# Patient Record
Sex: FEMALE | Race: WHITE | NOT HISPANIC OR LATINO | Employment: FULL TIME | URBAN - METROPOLITAN AREA
[De-identification: names, ages, dates, MRNs, and addresses within clinical notes are randomized per-mention and may not be internally consistent; named-entity substitution may affect disease eponyms.]

---

## 2017-11-06 ENCOUNTER — OFFICE VISIT (OUTPATIENT)
Dept: UROGYNECOLOGY | Facility: CLINIC | Age: 22
End: 2017-11-06
Payer: COMMERCIAL

## 2017-11-06 VITALS — HEIGHT: 67 IN | BODY MASS INDEX: 16.74 KG/M2 | WEIGHT: 106.69 LBS

## 2017-11-06 DIAGNOSIS — Z01.419 WELL WOMAN EXAM: Primary | ICD-10-CM

## 2017-11-06 DIAGNOSIS — N93.0 PCB (POST COITAL BLEEDING): ICD-10-CM

## 2017-11-06 LAB
B-HCG UR QL: NEGATIVE
CTP QC/QA: YES

## 2017-11-06 PROCEDURE — 88175 CYTOPATH C/V AUTO FLUID REDO: CPT

## 2017-11-06 PROCEDURE — 99999 PR PBB SHADOW E&M-NEW PATIENT-LVL III: CPT | Mod: PBBFAC,,, | Performed by: NURSE PRACTITIONER

## 2017-11-06 PROCEDURE — 99385 PREV VISIT NEW AGE 18-39: CPT | Mod: S$GLB,,, | Performed by: NURSE PRACTITIONER

## 2017-11-06 PROCEDURE — 87591 N.GONORRHOEAE DNA AMP PROB: CPT

## 2017-11-06 PROCEDURE — 81025 URINE PREGNANCY TEST: CPT | Mod: S$GLB,,, | Performed by: NURSE PRACTITIONER

## 2017-11-06 PROCEDURE — 87480 CANDIDA DNA DIR PROBE: CPT

## 2017-11-06 PROCEDURE — 87660 TRICHOMONAS VAGIN DIR PROBE: CPT

## 2017-11-06 RX ORDER — DESOGESTREL AND ETHINYL ESTRADIOL AND ETHINYL ESTRADIOL 21-5 (28)
KIT ORAL
COMMUNITY
Start: 2017-10-07

## 2017-11-06 RX ORDER — FLUOXETINE 20 MG/1
TABLET ORAL
COMMUNITY
Start: 2017-10-07

## 2017-11-06 RX ORDER — HYDROCORTISONE 25 MG/G
CREAM TOPICAL
COMMUNITY
Start: 2017-10-12

## 2017-11-06 NOTE — PROGRESS NOTES
"11/06/2017    SUBJECTIVE:   22 y.o. female for annual exam. Complains of post coital bleeding.    Past Medical History:   Diagnosis Date    Anxiety        Past Surgical History:   Procedure Laterality Date    APPENDECTOMY      WISDOM TOOTH EXTRACTION         Current Outpatient Prescriptions   Medication Sig Dispense Refill    FLUoxetine 20 MG tablet       PROCTOSOL HC 2.5 % rectal cream       VIORELE, 28, 0.15-0.02 mgx21 /0.01 mg x 5 per tablet        No current facility-administered medications for this visit.      Allergies: Patient has no known allergies.   Patient's last menstrual period was 10/29/2017 (exact date).      Well Woman:  Pap:n/a  Mammo:n/a  Colonoscopy:n/a  Dexa:n/a    ROS:  Feeling well.   No dyspnea or chest pain on exertion.    No abdominal pain, change in bowel habits, black or bloody stools.    No urinary tract symptoms.   GYN ROS: normal menses, no abnormal bleeding, pelvic pain or discharge, no breast pain or new or enlarging lumps on self exam, she complains of post coital bleeding.  No neurological complaints.    OBJECTIVE:   The patient appears well, alert, oriented x 3, in no distress.  Ht 5' 7" (1.702 m)   Wt 48.4 kg (106 lb 11.2 oz)   LMP 10/29/2017 (Exact Date)   BMI 16.71 kg/m²   ENT normal.  Neck supple. No adenopathy or thyromegaly. MAGNO.   Lungs are clear, good air entry, no wheezes, rhonchi or rales.   S1 and S2 normal, no murmurs, regular rate and rhythm.   Abdomen soft without tenderness, guarding, mass or organomegaly.   Extremities show no edema, normal peripheral pulses.   Neurological is normal, no focal findings.    BREAST EXAM: breasts appear normal, no suspicious masses, no skin or nipple changes or axillary nodes    PELVIC EXAM:   VULVA: normal appearing vulva with no masses, tenderness or lesions,   VAGINA: normal appearing vagina with normal color and discharge, no lesions,  CERVIX: normal appearing cervix without discharge or lesions, friable after pap, "   UTERUS: uterus is normal size, shape, consistency and nontender,   ADNEXA: normal adnexa in size, nontender and no masses,       ASSESSMENT:   1. Well woman exam  POCT urine pregnancy   2. PCB (post coital bleeding)  C. trachomatis/N. gonorrhoeae by AMP DNA Cervicovaginal    Vaginosis Screen by DNA Probe    Liquid-based pap smear, screening       PLAN:       1. Post coital bleeding  --genprobe, affirm today  --pap today-- takes 2-3 weeks for results    30 minutes were spent in face to face time with this patient  75 % of this time was spent in counseling and/or coordination of care  Nazia KENNEDY [facility]and [facility]  Division of Female Pelvic Medicine and Reconstructive Surgery  Department of Obstetrics & Gynecology

## 2017-11-07 LAB
C TRACH DNA SPEC QL NAA+PROBE: NOT DETECTED
CANDIDA RRNA VAG QL PROBE: NEGATIVE
G VAGINALIS RRNA GENITAL QL PROBE: POSITIVE
N GONORRHOEA DNA SPEC QL NAA+PROBE: NOT DETECTED
T VAGINALIS RRNA GENITAL QL PROBE: NEGATIVE

## 2017-11-08 ENCOUNTER — TELEPHONE (OUTPATIENT)
Dept: UROGYNECOLOGY | Facility: CLINIC | Age: 22
End: 2017-11-08

## 2017-11-08 DIAGNOSIS — B96.89 BACTERIAL VAGINOSIS: Primary | ICD-10-CM

## 2017-11-08 DIAGNOSIS — N76.0 BACTERIAL VAGINOSIS: Primary | ICD-10-CM

## 2017-11-08 RX ORDER — METRONIDAZOLE 500 MG/1
500 TABLET ORAL EVERY 12 HOURS
Qty: 14 TABLET | Refills: 0 | Status: SHIPPED | OUTPATIENT
Start: 2017-11-08 | End: 2017-11-15

## 2017-11-09 NOTE — TELEPHONE ENCOUNTER
Notified patient of + bacterial vaginosis.  Rx for flagyl sent to pharmacy.  Nazia Page, RACHELLEP-BC

## 2017-11-17 ENCOUNTER — OFFICE VISIT (OUTPATIENT)
Dept: OBSTETRICS AND GYNECOLOGY | Facility: CLINIC | Age: 22
End: 2017-11-17
Payer: COMMERCIAL

## 2017-11-17 VITALS
SYSTOLIC BLOOD PRESSURE: 98 MMHG | DIASTOLIC BLOOD PRESSURE: 64 MMHG | WEIGHT: 103.19 LBS | BODY MASS INDEX: 16.2 KG/M2 | HEIGHT: 67 IN

## 2017-11-17 DIAGNOSIS — B96.89 BV (BACTERIAL VAGINOSIS): ICD-10-CM

## 2017-11-17 DIAGNOSIS — N92.1 BREAKTHROUGH BLEEDING ON BIRTH CONTROL PILLS: Primary | ICD-10-CM

## 2017-11-17 DIAGNOSIS — N76.0 BV (BACTERIAL VAGINOSIS): ICD-10-CM

## 2017-11-17 PROCEDURE — 99213 OFFICE O/P EST LOW 20 MIN: CPT | Mod: S$GLB,,, | Performed by: NURSE PRACTITIONER

## 2017-11-17 PROCEDURE — 99999 PR PBB SHADOW E&M-EST. PATIENT-LVL III: CPT | Mod: PBBFAC,,, | Performed by: NURSE PRACTITIONER

## 2017-11-17 RX ORDER — METRONIDAZOLE 7.5 MG/G
1 GEL VAGINAL DAILY
Qty: 70 G | Refills: 4 | Status: SHIPPED | OUTPATIENT
Start: 2017-11-17 | End: 2017-11-22

## 2017-11-17 NOTE — PROGRESS NOTES
HISTORY OF PRESENT ILLNESS:    Liane Reilly is a 22 y.o. female, , Patient's last menstrual period was 10/29/2017 (exact date).,  Presents with c/o of BTB on OCPs.  -started with PCB at the beginning of November and had a negative STD screen and was treated for BV.   -however, cannot remember to take the Flagyl every day correctly so requests a vaginal Rx.   -now is having BTB on her OCPs since starting the Flagyl and has two more weeks to go before the sugar pills.   -hasn't missed a pill.   -denies fever, pelvic pain, odor, itching, discharge, dyspareunia, UTI sx.     Past Medical History:   Diagnosis Date    Anxiety        Past Surgical History:   Procedure Laterality Date    APPENDECTOMY      WISDOM TOOTH EXTRACTION         MEDICATIONS AND ALLERGIES:      Current Outpatient Prescriptions:     FLUoxetine 20 MG tablet, , Disp: , Rfl:     PROCTOSOL HC 2.5 % rectal cream, , Disp: , Rfl:     VIORELE, 28, 0.15-0.02 mgx21 /0.01 mg x 5 per tablet, , Disp: , Rfl:     estrogens, conjugated, (PREMARIN) 0.625 MG tablet, Take 1 tablet (0.625 mg total) by mouth once daily., Disp: 30 tablet, Rfl: 0    metroNIDAZOLE (METROGEL VAGINAL) 0.75 % vaginal gel, Place 1 applicator vaginally once daily., Disp: 70 g, Rfl: 4    Review of patient's allergies indicates:  No Known Allergies    Family History   Problem Relation Age of Onset    Ovarian cancer Neg Hx     Breast cancer Neg Hx     Colon cancer Neg Hx        Social History     Social History    Marital status: Single     Spouse name: N/A    Number of children: N/A    Years of education: N/A     Occupational History    Not on file.     Social History Main Topics    Smoking status: Never Smoker    Smokeless tobacco: Never Used    Alcohol use Yes      Comment: occassionally    Drug use: No    Sexual activity: Yes     Partners: Male     Birth control/ protection: Condom     Other Topics Concern    Not on file     Social History Narrative    No narrative on  "file       OB HISTORY: None.     COMPREHENSIVE GYN HISTORY:  PAP History: Denies abnormal Paps. LAST PAP 11-6-17 NORMAL.  Infection History: Denies STDs. Denies PID.  Benign History: Denies uterine fibroids. Denies ovarian cysts. Denies endometriosis. Denies other conditions.  Cancer History: Denies cervical cancer. Denies uterine cancer or hyperplasia. Denies ovarian cancer. Denies vulvar cancer or pre-cancer. Denies vaginal cancer or pre-cancer. Denies breast cancer. Denies colon cancer.  Sexual Activity History: Reports currently being sexually active  Menstrual History: Monthly. Mod then light flow.  SEE HPI - having BTB.  Dysmenorrhea History: Reports mild dysmenorrhea.   Contraception: OCPs.     ROS:  GENERAL: No weight changes. No swelling. No fatigue. No fever.  ABDOMEN: No pain. No nausea. No vomiting. No diarrhea. No constipation.  REPRODUCTIVE: + BTB.   VULVA: No pain. No lesions. No itching.  VAGINA: No relaxation. No itching. No odor. No discharge. No lesions.  URINARY: No incontinence. No nocturia. No frequency. No dysuria.    BP 98/64   Ht 5' 7" (1.702 m)   Wt 46.8 kg (103 lb 2.8 oz)   LMP 10/29/2017 (Exact Date)   BMI 16.16 kg/m²     PE:  APPEARANCE: Well nourished, well developed, in no acute distress.  AFFECT: WNL, alert and oriented x 3.  PELVIC: External female genitalia without lesions.  Female hair distribution. Adequate perineal body, Normal urethral meatus. Vagina moist and well rugated without lesions or discharge. MINIMAL DARK BROWN BLOOD PRESENT.  No significant cystocele or rectocele present. Cervix pink without lesions, discharge or tenderness. NO ACTIVE BLEEDING. Uterus is 4-6 week size, regular, mobile and nontender. Adnexa without masses or tenderness.    PROCEDURES:  UPT negative    DIAGNOSIS:  1. Breakthrough bleeding on birth control pills    2. BV (bacterial vaginosis)        PLAN:    Orders Placed This Encounter    estrogens, conjugated, (PREMARIN) 0.625 MG tablet    " metroNIDAZOLE (METROGEL VAGINAL) 0.75 % vaginal gel       COUNSELING:  The patient was counseled today on:  -Vaginitis prevention including :  a. avoiding feminine products such as deoderant soaps, body wash, bubble bath, douches, scented toilet paper, deoderant tampons or pads, baby or feminine wipes, chronic pad use, etc. and       b. avoiding other vulvovaginal irritants such as long hot baths, humidity, tight, synthetic clothing, chlorine and sitting around in wet bathing suits and   c. wearing cotton underwear, avoiding thong underwear and no underwear to bed and      d. taking showers instead of baths and use a hair dryer on cool setting afterwards to dry and  e.wearing cotton to exercise and shower immediately after exercise and change clothes and  f. using polyurethane condoms without spermicide if sexually active and symptoms are triggered by intercourse.  -Metrogel use and potential side effects;  -pelvic rest until symptoms resolve;  -likely BTB from taking antibiotics with OCPs;  -use of Premarin with OCPs rest of pack and potential side effects.    FOLLOW-UP with me prn.

## 2017-11-21 ENCOUNTER — TELEPHONE (OUTPATIENT)
Dept: UROGYNECOLOGY | Facility: CLINIC | Age: 22
End: 2017-11-21

## 2017-11-21 NOTE — TELEPHONE ENCOUNTER
----- Message from Nazia Page NP sent at 11/21/2017  4:09 PM CST -----  Please let patient know pap smear was normal.  YASMINE Joseph-BC

## 2017-11-28 ENCOUNTER — OFFICE VISIT (OUTPATIENT)
Dept: OBSTETRICS AND GYNECOLOGY | Facility: CLINIC | Age: 22
End: 2017-11-28
Payer: COMMERCIAL

## 2017-11-28 VITALS
BODY MASS INDEX: 16.16 KG/M2 | SYSTOLIC BLOOD PRESSURE: 110 MMHG | DIASTOLIC BLOOD PRESSURE: 80 MMHG | HEIGHT: 67 IN | WEIGHT: 102.94 LBS

## 2017-11-28 DIAGNOSIS — N89.8 VAGINAL DISCHARGE: Primary | ICD-10-CM

## 2017-11-28 LAB
CANDIDA RRNA VAG QL PROBE: NEGATIVE
G VAGINALIS RRNA GENITAL QL PROBE: NEGATIVE
T VAGINALIS RRNA GENITAL QL PROBE: NEGATIVE

## 2017-11-28 PROCEDURE — 99999 PR PBB SHADOW E&M-EST. PATIENT-LVL III: CPT | Mod: PBBFAC,,, | Performed by: NURSE PRACTITIONER

## 2017-11-28 PROCEDURE — 99213 OFFICE O/P EST LOW 20 MIN: CPT | Mod: S$GLB,,, | Performed by: NURSE PRACTITIONER

## 2017-11-28 PROCEDURE — 87660 TRICHOMONAS VAGIN DIR PROBE: CPT

## 2017-11-28 PROCEDURE — 87480 CANDIDA DNA DIR PROBE: CPT

## 2017-11-28 RX ORDER — METRONIDAZOLE 500 MG/1
500 TABLET ORAL 2 TIMES DAILY
Qty: 28 TABLET | Refills: 1 | Status: SHIPPED | OUTPATIENT
Start: 2017-11-28 | End: 2017-12-12

## 2017-11-28 NOTE — PROGRESS NOTES
HISTORY OF PRESENT ILLNESS:    Liane Reilly is a 22 y.o. female  Patient's last menstrual period was 10/29/2017. presents today complaining of vaginal discharge again.   -Has been treated with Flagyl and Metrogel for BV and was symptom free for awhile, but symptoms are back.   -Stopped breakthrough bleeding from Flagyl with adding Premarin.   -However, now on her sugar pills and only had spotting.   -Denies intercourse since last visit.    Past Medical History:   Diagnosis Date    Anxiety        Past Surgical History:   Procedure Laterality Date    APPENDECTOMY      WISDOM TOOTH EXTRACTION         MEDICATIONS AND ALLERGIES:      Current Outpatient Prescriptions:     estrogens, conjugated, (PREMARIN) 0.625 MG tablet, Take 1 tablet (0.625 mg total) by mouth once daily., Disp: 30 tablet, Rfl: 0    FLUoxetine 20 MG tablet, , Disp: , Rfl:     metroNIDAZOLE (FLAGYL) 500 MG tablet, Take 1 tablet (500 mg total) by mouth 2 (two) times daily., Disp: 28 tablet, Rfl: 1    PROCTOSOL HC 2.5 % rectal cream, , Disp: , Rfl:     VIORELE, 28, 0.15-0.02 mgx21 /0.01 mg x 5 per tablet, , Disp: , Rfl:     Review of patient's allergies indicates:  No Known Allergies      OB HISTORY: None.      COMPREHENSIVE GYN HISTORY:  PAP History: Denies abnormal Paps. LAST PAP 17 NORMAL.  Infection History: Denies STDs. Denies PID.  Benign History: Denies uterine fibroids. Denies ovarian cysts. Denies endometriosis. Denies other conditions.  Cancer History: Denies cervical cancer. Denies uterine cancer or hyperplasia. Denies ovarian cancer. Denies vulvar cancer or pre-cancer. Denies vaginal cancer or pre-cancer. Denies breast cancer. Denies colon cancer.  Sexual Activity History: Reports currently being sexually active  Menstrual History: Monthly. Mod then light flow.   Dysmenorrhea History: Reports mild dysmenorrhea.   Contraception: OCPs.       ROS:  GENERAL: No fever or chills.  ABDOMEN: No pain. No nausea. No vomiting. No  diarrhea. No constipation.  REPRODUCTIVE: SEE HPI.   VULVA: No pain. No lesions. No itching.  VAGINA: No relaxation. No itching. No odor. + DISCHARGE. No lesions.  URINARY: No incontinence. No nocturia. No frequency. No dysuria.    PE:  APPEARANCE: Well nourished, well developed, in no acute distress.  AFFECT: WNL, alert and oriented x 3.  PELVIC: Normal external female genitalia without lesions. Normal hair distribution. Adequate perineal body, normal urethral meatus. Vagina pink and well rugated without lesions. CREAMY THICK CARDENAS DISCHARGE present WITHOUT ODOR. Cervix pink without lesions, discharge or tenderness. No significant cystocele or rectocele. Bimanual exam shows uterus to be 4-6 weeks size, regular, mobile and nontender. Adnexa without masses or tenderness.    PROCEDURES:  UPT negative    DIAGNOSIS:  1. Vaginal discharge        PLAN:    Orders Placed This Encounter    Vaginosis Screen by DNA Probe    metroNIDAZOLE (FLAGYL) 500 MG tablet       COUNSELING:  The patient was counseled today on:  -vaginitis prevention same as note 11-17-17;  -option to try Cleocin vaginal suppositories, but pt requests Flagyl - will treat for 2 weeks, understanding that she may have BTB again;  -Flagyl use and potential side effects;  -pelvic rest until symptoms clear;  -follow with OTC vaginal RepHresh 2-3 times a week after completing the Flagyl.     FOLLOW-UP with me pending test results.

## 2020-01-20 ENCOUNTER — HOSPITAL ENCOUNTER (EMERGENCY)
Facility: OTHER | Age: 25
Discharge: HOME | End: 2020-01-20
Attending: EMERGENCY MEDICINE
Payer: COMMERCIAL

## 2020-01-20 VITALS
TEMPERATURE: 98 F | OXYGEN SATURATION: 99 % | WEIGHT: 120 LBS | RESPIRATION RATE: 20 BRPM | HEIGHT: 67 IN | HEART RATE: 92 BPM | DIASTOLIC BLOOD PRESSURE: 61 MMHG | BODY MASS INDEX: 18.83 KG/M2 | SYSTOLIC BLOOD PRESSURE: 119 MMHG

## 2020-01-20 DIAGNOSIS — F10.120 HANGOVER WITHOUT COMPLICATION: ICD-10-CM

## 2020-01-20 DIAGNOSIS — R11.2 NAUSEA AND VOMITING, INTRACTABILITY OF VOMITING NOT SPECIFIED, UNSPECIFIED VOMITING TYPE: Primary | ICD-10-CM

## 2020-01-20 LAB
ALBUMIN SERPL BCP-MCNC: 3.5 G/DL (ref 3.5–5.2)
ALP SERPL-CCNC: 59 U/L (ref 55–135)
ALT SERPL W/O P-5'-P-CCNC: 20 U/L (ref 10–44)
ANION GAP SERPL CALC-SCNC: 14 MMOL/L (ref 8–16)
AST SERPL-CCNC: 23 U/L (ref 10–40)
B-HCG UR QL: NEGATIVE
BACTERIA #/AREA URNS HPF: ABNORMAL /HPF
BASOPHILS # BLD AUTO: 0.06 K/UL (ref 0–0.2)
BASOPHILS NFR BLD: 0.5 % (ref 0–1.9)
BILIRUB SERPL-MCNC: 0.5 MG/DL (ref 0.1–1)
BILIRUB UR QL STRIP: NEGATIVE
BUN SERPL-MCNC: 10 MG/DL (ref 6–20)
CALCIUM SERPL-MCNC: 8.4 MG/DL (ref 8.7–10.5)
CHLORIDE SERPL-SCNC: 107 MMOL/L (ref 95–110)
CLARITY UR: CLEAR
CO2 SERPL-SCNC: 20 MMOL/L (ref 23–29)
COLOR UR: YELLOW
CREAT SERPL-MCNC: 0.7 MG/DL (ref 0.5–1.4)
CTP QC/QA: YES
DIFFERENTIAL METHOD: ABNORMAL
EOSINOPHIL # BLD AUTO: 0 K/UL (ref 0–0.5)
EOSINOPHIL NFR BLD: 0.3 % (ref 0–8)
ERYTHROCYTE [DISTWIDTH] IN BLOOD BY AUTOMATED COUNT: 12.8 % (ref 11.5–14.5)
EST. GFR  (AFRICAN AMERICAN): >60 ML/MIN/1.73 M^2
EST. GFR  (NON AFRICAN AMERICAN): >60 ML/MIN/1.73 M^2
GLUCOSE SERPL-MCNC: 94 MG/DL (ref 70–110)
GLUCOSE UR QL STRIP: NEGATIVE
HCT VFR BLD AUTO: 45.2 % (ref 37–48.5)
HGB BLD-MCNC: 15.5 G/DL (ref 12–16)
HGB UR QL STRIP: NEGATIVE
IMM GRANULOCYTES # BLD AUTO: 0.04 K/UL (ref 0–0.04)
IMM GRANULOCYTES NFR BLD AUTO: 0.3 % (ref 0–0.5)
KETONES UR QL STRIP: NEGATIVE
LEUKOCYTE ESTERASE UR QL STRIP: ABNORMAL
LYMPHOCYTES # BLD AUTO: 3 K/UL (ref 1–4.8)
LYMPHOCYTES NFR BLD: 23.2 % (ref 18–48)
MCH RBC QN AUTO: 30.2 PG (ref 27–31)
MCHC RBC AUTO-ENTMCNC: 34.3 G/DL (ref 32–36)
MCV RBC AUTO: 88 FL (ref 82–98)
MICROSCOPIC COMMENT: ABNORMAL
MONOCYTES # BLD AUTO: 0.8 K/UL (ref 0.3–1)
MONOCYTES NFR BLD: 5.9 % (ref 4–15)
NEUTROPHILS # BLD AUTO: 8.9 K/UL (ref 1.8–7.7)
NEUTROPHILS NFR BLD: 69.8 % (ref 38–73)
NITRITE UR QL STRIP: NEGATIVE
NRBC BLD-RTO: 0 /100 WBC
PH UR STRIP: 8.5 [PH] (ref 5–8)
PLATELET # BLD AUTO: 347 K/UL (ref 150–350)
PMV BLD AUTO: 9.6 FL (ref 9.2–12.9)
POTASSIUM SERPL-SCNC: 3.9 MMOL/L (ref 3.5–5.1)
PROT SERPL-MCNC: 7.1 G/DL (ref 6–8.4)
PROT UR QL STRIP: NEGATIVE
RBC # BLD AUTO: 5.13 M/UL (ref 4–5.4)
RBC #/AREA URNS HPF: 0 /HPF (ref 0–4)
SODIUM SERPL-SCNC: 141 MMOL/L (ref 136–145)
SP GR UR STRIP: 1.01 (ref 1–1.03)
SQUAMOUS #/AREA URNS HPF: >100 /HPF
URN SPEC COLLECT METH UR: ABNORMAL
UROBILINOGEN UR STRIP-ACNC: NEGATIVE EU/DL
WBC # BLD AUTO: 12.74 K/UL (ref 3.9–12.7)
WBC #/AREA URNS HPF: 18 /HPF (ref 0–5)
WBC CLUMPS URNS QL MICRO: ABNORMAL

## 2020-01-20 PROCEDURE — 81025 URINE PREGNANCY TEST: CPT | Performed by: EMERGENCY MEDICINE

## 2020-01-20 PROCEDURE — 96361 HYDRATE IV INFUSION ADD-ON: CPT

## 2020-01-20 PROCEDURE — 96374 THER/PROPH/DIAG INJ IV PUSH: CPT

## 2020-01-20 PROCEDURE — 81000 URINALYSIS NONAUTO W/SCOPE: CPT

## 2020-01-20 PROCEDURE — 80053 COMPREHEN METABOLIC PANEL: CPT

## 2020-01-20 PROCEDURE — 87086 URINE CULTURE/COLONY COUNT: CPT

## 2020-01-20 PROCEDURE — 63600175 PHARM REV CODE 636 W HCPCS: Performed by: NURSE PRACTITIONER

## 2020-01-20 PROCEDURE — 85025 COMPLETE CBC W/AUTO DIFF WBC: CPT

## 2020-01-20 PROCEDURE — 99284 EMERGENCY DEPT VISIT MOD MDM: CPT | Mod: 25

## 2020-01-20 PROCEDURE — 96376 TX/PRO/DX INJ SAME DRUG ADON: CPT

## 2020-01-20 RX ORDER — ONDANSETRON 2 MG/ML
4 INJECTION INTRAMUSCULAR; INTRAVENOUS
Status: COMPLETED | OUTPATIENT
Start: 2020-01-20 | End: 2020-01-20

## 2020-01-20 RX ORDER — ONDANSETRON 4 MG/1
4 TABLET, FILM COATED ORAL EVERY 6 HOURS
Qty: 12 TABLET | Refills: 0 | Status: SHIPPED | OUTPATIENT
Start: 2020-01-20

## 2020-01-20 RX ADMIN — SODIUM CHLORIDE 1000 ML: 0.9 INJECTION, SOLUTION INTRAVENOUS at 05:01

## 2020-01-20 RX ADMIN — ONDANSETRON 4 MG: 2 INJECTION INTRAMUSCULAR; INTRAVENOUS at 05:01

## 2020-01-20 RX ADMIN — ONDANSETRON 4 MG: 2 INJECTION INTRAMUSCULAR; INTRAVENOUS at 06:01

## 2020-01-20 NOTE — ED TRIAGE NOTES
Pt presents to ed c/o n/v since yesterday. She states she is visiting from out of town and was drinking a lot of ETOH on bourbon. Pt denies any fever but admits to chills. Pt AAOx4, resp pattern even and non labored.

## 2020-01-21 NOTE — ED PROVIDER NOTES
"Encounter Date: 1/20/2020       History     Chief Complaint   Patient presents with    Vomiting     pt states" supper hung over and would like to get that fixed" pt with vomitng today.     24-year-old female which presents the emergency room with being "super hung over."  Patient states she was drinking from 1:00 p.m. yesterday to 1:00 a.m. this morning and woke up vomiting. She had 1 episode of vomiting but has continued nausea.  She has also had the shakes all day.  She has been unable to eat or drink secondary to the nausea.  Patient denies any abdominal pain or headache.    The history is provided by the patient.     Review of patient's allergies indicates:  No Known Allergies  Past Medical History:   Diagnosis Date    Anxiety      Past Surgical History:   Procedure Laterality Date    APPENDECTOMY      WISDOM TOOTH EXTRACTION       Family History   Problem Relation Age of Onset    Ovarian cancer Neg Hx     Breast cancer Neg Hx     Colon cancer Neg Hx      Social History     Tobacco Use    Smoking status: Never Smoker    Smokeless tobacco: Never Used   Substance Use Topics    Alcohol use: Yes     Comment: occassionally    Drug use: No     Review of Systems   Constitutional: Negative for fever.   HENT: Negative for sore throat.    Respiratory: Negative for shortness of breath.    Cardiovascular: Negative for chest pain.   Gastrointestinal: Positive for nausea and vomiting. Negative for abdominal pain.   Genitourinary: Negative for dysuria.   Musculoskeletal: Negative for back pain.   Skin: Negative for rash.   Neurological: Negative for weakness.   Hematological: Does not bruise/bleed easily.   All other systems reviewed and are negative.    Physical Exam     Initial Vitals [01/20/20 1646]   BP Pulse Resp Temp SpO2   107/65 92 18 97.8 °F (36.6 °C) 100 %      MAP       --         Physical Exam    Nursing note and vitals reviewed.  Constitutional: She appears well-developed and well-nourished.   HENT: " "  Head: Normocephalic and atraumatic.   Mouth/Throat: Uvula is midline. Mucous membranes are dry. No posterior oropharyngeal edema, posterior oropharyngeal erythema or tonsillar abscesses.   Eyes: Conjunctivae and EOM are normal. Pupils are equal, round, and reactive to light.   Cardiovascular: Normal rate, regular rhythm, normal heart sounds and intact distal pulses. Exam reveals no gallop and no friction rub.    No murmur heard.  Pulmonary/Chest: Breath sounds normal. No respiratory distress. She has no wheezes. She has no rhonchi. She has no rales. She exhibits no tenderness.   Neurological: She is alert and oriented to person, place, and time. She has normal strength. GCS score is 15. GCS eye subscore is 4. GCS verbal subscore is 5. GCS motor subscore is 6.       ED Course   Procedures  Labs Reviewed   URINALYSIS, REFLEX TO URINE CULTURE - Abnormal; Notable for the following components:       Result Value    Leukocytes, UA 1+ (*)     All other components within normal limits    Narrative:     Preferred Collection Type->Urine, Clean Catch   CBC W/ AUTO DIFFERENTIAL - Abnormal; Notable for the following components:    WBC 12.74 (*)     Gran # (ANC) 8.9 (*)     All other components within normal limits   URINALYSIS MICROSCOPIC - Abnormal; Notable for the following components:    WBC, UA 18 (*)     Bacteria Many (*)     All other components within normal limits    Narrative:     Preferred Collection Type->Urine, Clean Catch   COMPREHENSIVE METABOLIC PANEL - Abnormal; Notable for the following components:    CO2 20 (*)     Calcium 8.4 (*)     All other components within normal limits   CULTURE, URINE   POCT URINE PREGNANCY           Medical Decision Making:   Initial Assessment:   24-year-old female which presents the emergency room with being "super hung over."  Patient states she was drinking from 1:00 p.m. yesterday to 1:00 a.m. this morning and woke up vomiting. She had 1 episode of vomiting but has continued " nausea.  She has also had the shakes all day.  She has been unable to eat or drink secondary to the nausea.  Patient denies any abdominal pain or headache.    Differential Diagnosis:   Dehydration, hangover, excessive alcohol consumption  Clinical Tests:   Lab Tests: Ordered and Reviewed  The following lab test(s) were unremarkable: CBC, CMP and Urinalysis  ED Management:  Patient examined has a normal exam.  She does not have any abdominal pain.  She has not had any episodes of vomiting while in the ED.  She was given Zofran 8 mg along with a L of normal saline.  Patient will be discharged with Zofran and advised to hydrate with powerade and gatorade. Patient given strict return precautions and voiced understanding of all discharge instructions. Pt was stable at discharge.                        ED Course as of Jan 20 1922 Mon Jan 20, 2020 1721 BP: 107/65 [AT]   1721 Temp: 97.8 °F (36.6 °C) [AT]   1721 Temp src: Oral [AT]   1721 Pulse: 92 [AT]   1721 Resp: 18 [AT]   1721 SpO2: 100 % [AT]   1721 Preg Test, Ur: Negative [AT]   1803 WBC(!): 12.74 [AT]   1824 Pt is not having any urinary symptoms   Bacteria, UA(!): Many [AT]   1825 WBC, UA(!): 18 [AT]   1825 Squam Epithel, UA: >100 [AT]   1825 Leukocytes, UA(!): 1+ [AT]      ED Course User Index  [AT] YASMINE Scott            Clinical Impression:       ICD-10-CM ICD-9-CM   1. Nausea and vomiting, intractability of vomiting not specified, unspecified vomiting type R11.2 787.01   2. Hangover without complication F10.120 305.00                             YASMINE Scott  01/20/20 1922

## 2020-01-22 LAB — BACTERIA UR CULT: NO GROWTH

## 2020-03-09 NOTE — PATIENT DISCUSSION
Retinal exam findings communicated to Physician managing diabetes in [place] Dr. [facility]#[phone].

## 2020-03-16 NOTE — PROCEDURE NOTE: CLINICAL
PROCEDURE NOTE: Avastin () OS. Diagnosis: Central Retinal Vein Occlusion with Macular Edema. Prep: Betadine Drops and Betadine Scrub. Prior to the original injection, risks/benefits/alternatives discussed including infection, loss of vision, hemorrhage, cataract, glaucoma, retinal tears or detachment. A written consent is on file, and the need for today’s injection was discussed and the patient is understanding and wishes to proceed. The off-label status of Intravitreal Avastin also was reviewed. The patient wished to proceed with treatment. The patient wished to proceed with treatment. Topical anesthetic drops were applied to the eye. Betadine prep was performed. Surgical mask worn. Sterile drape and lid speculum were applied. Using the syringe provided, Avastin 1.25 mg in 0.05 cc was injected into the vitreous cavity. Injection site: 3-4 mm from the limbus. Patient tolerated procedure well. Following the intravitreal injection, the sterile lid speculum was removed. CRA perfusion confirmed. CF vision checked. Patient given office phone number/answering service number and advised to call immediately should there be an increase in floaters or redness, loss of vision or pain, or should they have any other questions or concerns. Mary Lou Harley

## 2020-10-01 ENCOUNTER — APPOINTMENT (RX ONLY)
Dept: URBAN - METROPOLITAN AREA CLINIC 124 | Facility: CLINIC | Age: 25
Setting detail: DERMATOLOGY
End: 2020-10-01

## 2020-10-01 DIAGNOSIS — D18.0 HEMANGIOMA: ICD-10-CM

## 2020-10-01 DIAGNOSIS — D22 MELANOCYTIC NEVI: ICD-10-CM

## 2020-10-01 DIAGNOSIS — Z71.89 OTHER SPECIFIED COUNSELING: ICD-10-CM

## 2020-10-01 DIAGNOSIS — L81.4 OTHER MELANIN HYPERPIGMENTATION: ICD-10-CM

## 2020-10-01 PROBLEM — D22.61 MELANOCYTIC NEVI OF RIGHT UPPER LIMB, INCLUDING SHOULDER: Status: ACTIVE | Noted: 2020-10-01

## 2020-10-01 PROBLEM — D18.01 HEMANGIOMA OF SKIN AND SUBCUTANEOUS TISSUE: Status: ACTIVE | Noted: 2020-10-01

## 2020-10-01 PROCEDURE — ? COUNSELING

## 2020-10-01 PROCEDURE — 99203 OFFICE O/P NEW LOW 30 MIN: CPT

## 2020-10-01 ASSESSMENT — LOCATION DETAILED DESCRIPTION DERM
LOCATION DETAILED: RIGHT INFERIOR MEDIAL UPPER BACK
LOCATION DETAILED: SUPERIOR THORACIC SPINE
LOCATION DETAILED: RIGHT ANTERIOR SHOULDER

## 2020-10-01 ASSESSMENT — LOCATION ZONE DERM
LOCATION ZONE: ARM
LOCATION ZONE: TRUNK

## 2020-10-01 ASSESSMENT — LOCATION SIMPLE DESCRIPTION DERM
LOCATION SIMPLE: UPPER BACK
LOCATION SIMPLE: RIGHT SHOULDER
LOCATION SIMPLE: RIGHT UPPER BACK

## 2020-10-01 NOTE — PROCEDURE: MIPS QUALITY
Quality 130: Documentation Of Current Medications In The Medical Record: Eligible clinician attests to documenting in the medical record the patient is not eligible for a current list of medications being obtained, updated, or reviewed by the eligible clinician
Quality 111:Pneumonia Vaccination Status For Older Adults: Pneumococcal Vaccination not Administered or Previously Received, Reason not Otherwise Specified
Detail Level: Detailed
Additional Notes: Patient is unaware of medication information

## 2021-01-28 ENCOUNTER — NEW PATIENT COMPREHENSIVE (OUTPATIENT)
Dept: URBAN - METROPOLITAN AREA CLINIC 32 | Facility: CLINIC | Age: 26
End: 2021-01-28

## 2021-01-28 DIAGNOSIS — H52.03: ICD-10-CM

## 2021-01-28 DIAGNOSIS — H52.223: ICD-10-CM

## 2021-01-28 PROCEDURE — 92015 DETERMINE REFRACTIVE STATE: CPT

## 2021-01-28 PROCEDURE — 92004 COMPRE OPH EXAM NEW PT 1/>: CPT

## 2021-01-28 ASSESSMENT — VISUAL ACUITY
OD_SC: 20/25-1
OS_SC: 20/30-1
OS_CC: 20/20
OD_CC: J1+
OS_CC: J1+
OD_CC: 20/20

## 2021-01-28 ASSESSMENT — KERATOMETRY
OD_AXISANGLE2_DEGREES: 178
OS_AXISANGLE_DEGREES: 78
OS_K1POWER_DIOPTERS: 44.50
OD_AXISANGLE_DEGREES: 88
OS_K2POWER_DIOPTERS: 43.50
OD_K1POWER_DIOPTERS: 44.00
OD_K2POWER_DIOPTERS: 43.00
OS_AXISANGLE2_DEGREES: 168

## 2021-01-28 ASSESSMENT — TONOMETRY
OD_IOP_MMHG: 14
OS_IOP_MMHG: 11

## 2023-09-21 ENCOUNTER — COMPREHENSIVE EXAM (OUTPATIENT)
Dept: URBAN - METROPOLITAN AREA CLINIC 32 | Facility: CLINIC | Age: 28
End: 2023-09-21

## 2023-09-21 DIAGNOSIS — H52.03: ICD-10-CM

## 2023-09-21 DIAGNOSIS — H04.123: ICD-10-CM

## 2023-09-21 PROCEDURE — 92015 DETERMINE REFRACTIVE STATE: CPT

## 2023-09-21 PROCEDURE — 92014 COMPRE OPH EXAM EST PT 1/>: CPT

## 2023-09-21 ASSESSMENT — TONOMETRY
OD_IOP_MMHG: 14
OS_IOP_MMHG: 14

## 2023-09-21 ASSESSMENT — KERATOMETRY
OS_AXISANGLE2_DEGREES: 169
OD_AXISANGLE_DEGREES: 84
OD_AXISANGLE2_DEGREES: 174
OS_K2POWER_DIOPTERS: 43.50
OS_AXISANGLE_DEGREES: 79
OD_K1POWER_DIOPTERS: 44.00
OS_K1POWER_DIOPTERS: 44.50
OD_K2POWER_DIOPTERS: 43.00

## 2023-09-21 ASSESSMENT — VISUAL ACUITY
OD_SC: 20/25
OS_SC: 20/25
OD_CC: 20/20-1
OS_CC: 20/20-1
OS_SC: J1+
OD_SC: J1+

## 2023-09-27 ENCOUNTER — NEW PATIENT (OUTPATIENT)
Dept: URBAN - METROPOLITAN AREA CLINIC 33 | Facility: CLINIC | Age: 28
End: 2023-09-27

## 2023-09-27 VITALS
HEIGHT: 67 IN | WEIGHT: 116 LBS | HEART RATE: 69 BPM | DIASTOLIC BLOOD PRESSURE: 70 MMHG | SYSTOLIC BLOOD PRESSURE: 94 MMHG | BODY MASS INDEX: 18.21 KG/M2

## 2023-09-27 DIAGNOSIS — H43.393: ICD-10-CM

## 2023-09-27 DIAGNOSIS — H31.092: ICD-10-CM

## 2023-09-27 PROCEDURE — 92004 COMPRE OPH EXAM NEW PT 1/>: CPT

## 2023-09-27 PROCEDURE — 92201 OPSCPY EXTND RTA DRAW UNI/BI: CPT

## 2023-09-27 ASSESSMENT — VISUAL ACUITY
OD_SC: 20/20
OS_SC: 20/20-1

## 2023-09-27 ASSESSMENT — TONOMETRY
OD_IOP_MMHG: 14
OS_IOP_MMHG: 12

## 2024-03-25 ENCOUNTER — EMERGENCY VISIT (OUTPATIENT)
Dept: URBAN - METROPOLITAN AREA CLINIC 32 | Facility: CLINIC | Age: 29
End: 2024-03-25

## 2024-03-25 DIAGNOSIS — H11.151: ICD-10-CM

## 2024-03-25 DIAGNOSIS — H10.13: ICD-10-CM

## 2024-03-25 PROCEDURE — 99213 OFFICE O/P EST LOW 20 MIN: CPT

## 2024-03-25 RX ORDER — POVIDONE 2.5 MG/.5G
1 SOLUTION, GEL FORMING / DROPS OPHTHALMIC AS NEEDED
Start: 2024-03-25

## 2024-03-25 RX ORDER — OLOPATADINE HYDROCHLORIDE 2 MG/ML
1 SOLUTION OPHTHALMIC
Start: 2024-03-25

## 2024-03-25 RX ORDER — LOTEPREDNOL ETABONATE 2 MG/ML
1 SUSPENSION/ DROPS OPHTHALMIC ONCE A DAY
Start: 2024-03-25

## 2024-03-25 ASSESSMENT — KERATOMETRY
OS_K1POWER_DIOPTERS: 44.50
OD_K1POWER_DIOPTERS: 44.00
OS_K2POWER_DIOPTERS: 43.50
OD_AXISANGLE2_DEGREES: 174
OS_AXISANGLE2_DEGREES: 169
OS_AXISANGLE_DEGREES: 79
OD_AXISANGLE_DEGREES: 84
OD_K2POWER_DIOPTERS: 43.00

## 2024-03-25 ASSESSMENT — TONOMETRY
OS_IOP_MMHG: 13
OD_IOP_MMHG: 16

## 2024-03-25 ASSESSMENT — VISUAL ACUITY
OS_SC: 20/30-1
OD_SC: 20/25+2